# Patient Record
Sex: MALE | Race: WHITE | NOT HISPANIC OR LATINO | Employment: FULL TIME | URBAN - METROPOLITAN AREA
[De-identification: names, ages, dates, MRNs, and addresses within clinical notes are randomized per-mention and may not be internally consistent; named-entity substitution may affect disease eponyms.]

---

## 2018-01-16 ENCOUNTER — OFFICE VISIT (OUTPATIENT)
Dept: ORTHOPEDIC SURGERY | Facility: CLINIC | Age: 48
End: 2018-01-16

## 2018-01-16 VITALS — BODY MASS INDEX: 32.14 KG/M2 | TEMPERATURE: 98.6 F | WEIGHT: 200 LBS | HEIGHT: 66 IN

## 2018-01-16 DIAGNOSIS — M16.11 ARTHRITIS OF RIGHT HIP: ICD-10-CM

## 2018-01-16 DIAGNOSIS — M25.551 HIP PAIN, RIGHT: Primary | ICD-10-CM

## 2018-01-16 PROCEDURE — 73502 X-RAY EXAM HIP UNI 2-3 VIEWS: CPT | Performed by: ORTHOPAEDIC SURGERY

## 2018-01-16 PROCEDURE — 99204 OFFICE O/P NEW MOD 45 MIN: CPT | Performed by: ORTHOPAEDIC SURGERY

## 2018-01-16 RX ORDER — LOSARTAN POTASSIUM AND HYDROCHLOROTHIAZIDE 12.5; 5 MG/1; MG/1
1 TABLET ORAL
COMMUNITY
Start: 2017-12-28

## 2018-01-16 RX ORDER — OMEPRAZOLE 20 MG/1
20 CAPSULE, DELAYED RELEASE ORAL
COMMUNITY
Start: 2017-10-23

## 2018-01-16 NOTE — PROGRESS NOTES
Patient Name: Anant Keyes   YOB: 1970  Referring Primary Care Physician: Art Hairston MD      Chief Complaint:    Chief Complaint   Patient presents with   • Right Hip - Pain        Subjective:    HPI:   Anant Keyes is a pleasant 47 y.o. year old who presents today for evaluation of   Chief Complaint   Patient presents with   • Right Hip - Pain   right hip pain since about August.  No particular trauma.  Achy in the groin and runs down the ant thigh.  Intermittent flares.  Takes 2 alleve prn.  Has been in PT for 2 months.      This problem is new to this examiner.     Medications:   Home Medications:  No current outpatient prescriptions on file prior to visit.     No current facility-administered medications on file prior to visit.      Current Medications:  Scheduled Meds:  Continuous Infusions:  No current facility-administered medications for this visit.   PRN Meds:.    I have reviewed the patient's medical history in detail and updated the computerized patient record.  Review and summarization of old records includes:    Past Medical History:   Diagnosis Date   • Acid reflux    • Hypertension         Past Surgical History:   Procedure Laterality Date   • BLADDER TUMOR EXCISION  07/2008        Social History     Occupational History   • Not on file.     Social History Main Topics   • Smoking status: Former Smoker     Years: 5.00     Types: Cigarettes   • Smokeless tobacco: Not on file      Comment: casual smoker, quit 2005 or 2006   • Alcohol use No   • Drug use: Defer   • Sexual activity: Defer    Social History     Social History Narrative   • No narrative on file        Family History   Problem Relation Age of Onset   • Hypertension Mother    • Diabetes Mother    • Hypertension Father        ROS: 14 point review of systems was performed and all other systems were reviewed and are negative except for documented findings in HPI and today's encounter.     Allergies: No Known  Allergies  Constitutional:  Denies fever, shaking or chills   Eyes:  Denies change in visual acuity   HENT:  Denies nasal congestion or sore throat   Respiratory:  Denies cough or shortness of breath   Cardiovascular:  Denies chest pain or severe LE edema   GI:  Denies abdominal pain, nausea, vomiting, bloody stools or diarrhea   Musculoskeletal:  Numbness, tingling, pain, or loss of motor function only as noted above in history of present illness.  : Denies painful urination or hematuria  Integument:  Denies rash, lesion or ulceration   Neurologic:  Denies headache or focal weakness  Endocrine:  Denies lymphadenopathy  Psych:  Denies confusion or change in mental status   Hem:  Denies active bleeding    Objective:    Physical Exam: 47 y.o. male  Body mass index is 32.28 kg/(m^2)., @WT@, @HT@  Vitals:    01/16/18 1310   Temp: 98.6 °F (37 °C)     Vital signs reviewed.   General Appearance:    Alert, cooperative, in no acute distress                  Eyes: conjunctiva clear  ENT: external ears and nose atraumatic  CV: no peripheral edema  Resp: normal respiratory effort  Skin: no rashes or wounds; normal turgor  Psych: mood and affect appropriate  Lymph: no nodes appreciated  Neuro: gross sensation intact  Vascular:  Palpable peripheral pulse in noted extremity  Musculoskeletal Extremities: HIP Exam: stiff-legged gait without assistive device right hip, Stinchfield postitive, pain with internal rotation and stiffness 2+ pedal pulses and brisk capillary refill Pedal edema none    Radiology:   Imaging done today and discussed at length with the patient:    Indication: pain related symptoms,  Views: 2V AP&LAT right hip(s)   Findings: severe end-stage arthritis (bone on bone, subchondral sclerosis/cysts, osteophytes), advanced arthritis, and MRI that shows arthritis, fraying of the labrum, cyst in the iliopsoas  Comparison views: not available      Assessment:     ICD-10-CM ICD-9-CM   1. Hip pain, right M25.551 719.45    2. Arthritis of right hip M16.11 716.95        Procedures       Plan: Biomechanics of pertinent body area discussed.  Risks, benefits, alternatives, comparisons, and complications of accepted medicines, injections, recommendations, surgical procedures, and therapies explained and education provided in laymen's terms. The patient was given the opportunity to ask questions and they were answerved to their satisfaction.   Natural history and expected course of this patient's diagnosis discussed along with evaluation of therapies. Questions answered.  OTC analgesics as needed with dosage warning and instructions given.  Cryotherapy/brachy therapy as indicated with instructions.    Will set up for an injection at [facility] 2 bid with food with precuations (on omep and bp med), tylenol supplement and HEP of his PT.  Fu 4-6 wks after the injections      1/16/2018

## 2018-02-09 ENCOUNTER — HOSPITAL ENCOUNTER (OUTPATIENT)
Dept: GENERAL RADIOLOGY | Facility: HOSPITAL | Age: 48
Discharge: HOME | End: 2018-02-09
Attending: ORTHOPAEDIC SURGERY | Admitting: ORTHOPAEDIC SURGERY

## 2018-02-09 DIAGNOSIS — M16.11 ARTHRITIS OF RIGHT HIP: ICD-10-CM

## 2018-02-09 PROCEDURE — 77002 NEEDLE LOCALIZATION BY XRAY: CPT

## 2018-02-09 PROCEDURE — 0 IOPAMIDOL 61 % SOLUTION: Performed by: RADIOLOGY

## 2018-02-09 PROCEDURE — 25010000002 METHYLPREDNISOLONE PER 80 MG: Performed by: RADIOLOGY

## 2018-02-09 RX ORDER — BUPIVACAINE HYDROCHLORIDE 2.5 MG/ML
5 INJECTION, SOLUTION EPIDURAL; INFILTRATION; INTRACAUDAL ONCE
Status: COMPLETED | OUTPATIENT
Start: 2018-02-09 | End: 2018-02-09

## 2018-02-09 RX ORDER — METHYLPREDNISOLONE ACETATE 80 MG/ML
80 INJECTION, SUSPENSION INTRA-ARTICULAR; INTRALESIONAL; INTRAMUSCULAR; SOFT TISSUE ONCE
Status: COMPLETED | OUTPATIENT
Start: 2018-02-09 | End: 2018-02-09

## 2018-02-09 RX ORDER — LIDOCAINE HYDROCHLORIDE 10 MG/ML
10 INJECTION, SOLUTION INFILTRATION; PERINEURAL ONCE
Status: COMPLETED | OUTPATIENT
Start: 2018-02-09 | End: 2018-02-09

## 2018-02-09 RX ADMIN — BUPIVACAINE HYDROCHLORIDE 5 ML: 2.5 INJECTION, SOLUTION EPIDURAL; INFILTRATION; INTRACAUDAL; PERINEURAL at 12:22

## 2018-02-09 RX ADMIN — METHYLPREDNISOLONE ACETATE 80 MG: 80 INJECTION, SUSPENSION INTRA-ARTICULAR; INTRALESIONAL; INTRAMUSCULAR; SOFT TISSUE at 12:22

## 2018-02-09 RX ADMIN — LIDOCAINE HYDROCHLORIDE 5 ML: 10 INJECTION, SOLUTION INFILTRATION; PERINEURAL at 12:20

## 2018-02-09 RX ADMIN — IOPAMIDOL 2 ML: 612 INJECTION, SOLUTION INTRAVENOUS at 12:22

## 2018-07-24 ENCOUNTER — OFFICE VISIT (OUTPATIENT)
Dept: ORTHOPEDIC SURGERY | Facility: CLINIC | Age: 48
End: 2018-07-24

## 2018-07-24 VITALS — TEMPERATURE: 98.7 F | BODY MASS INDEX: 33.59 KG/M2 | WEIGHT: 209 LBS | HEIGHT: 66 IN

## 2018-07-24 DIAGNOSIS — M16.11 ARTHRITIS OF RIGHT HIP: Primary | ICD-10-CM

## 2018-07-24 DIAGNOSIS — M25.551 HIP PAIN, RIGHT: ICD-10-CM

## 2018-07-24 DIAGNOSIS — M70.61 TROCHANTERIC BURSITIS OF RIGHT HIP: ICD-10-CM

## 2018-07-24 PROCEDURE — 73502 X-RAY EXAM HIP UNI 2-3 VIEWS: CPT | Performed by: NURSE PRACTITIONER

## 2018-07-24 PROCEDURE — 99212 OFFICE O/P EST SF 10 MIN: CPT | Performed by: NURSE PRACTITIONER

## 2018-07-24 PROCEDURE — 20610 DRAIN/INJ JOINT/BURSA W/O US: CPT | Performed by: NURSE PRACTITIONER

## 2018-07-24 RX ORDER — LIDOCAINE HYDROCHLORIDE 20 MG/ML
4 INJECTION, SOLUTION EPIDURAL; INFILTRATION; INTRACAUDAL; PERINEURAL
Status: COMPLETED | OUTPATIENT
Start: 2018-07-24 | End: 2018-07-24

## 2018-07-24 RX ORDER — METHYLPREDNISOLONE ACETATE 80 MG/ML
80 INJECTION, SUSPENSION INTRA-ARTICULAR; INTRALESIONAL; INTRAMUSCULAR; SOFT TISSUE
Status: COMPLETED | OUTPATIENT
Start: 2018-07-24 | End: 2018-07-24

## 2018-07-24 RX ADMIN — LIDOCAINE HYDROCHLORIDE 4 ML: 20 INJECTION, SOLUTION EPIDURAL; INFILTRATION; INTRACAUDAL; PERINEURAL at 14:28

## 2018-07-24 RX ADMIN — METHYLPREDNISOLONE ACETATE 80 MG: 80 INJECTION, SUSPENSION INTRA-ARTICULAR; INTRALESIONAL; INTRAMUSCULAR; SOFT TISSUE at 14:28

## 2018-07-24 NOTE — PROGRESS NOTES
Patient Name: Anant Keyes   YOB: 1970  Referring Primary Care Physician: Art Hairston MD  BMI: Body mass index is 33.73 kg/m².    Chief Complaint:    Chief Complaint   Patient presents with   • Right Hip - Follow-up, Pain        Subjective:    HPI:   Anant Keyes is a pleasant 47 y.o. year old who presents today for evaluation of   Chief Complaint   Patient presents with   • Right Hip - Follow-up, Pain    (Location). Duration: This has been going on for months. Timing: The pain is intermittent., persistent. and acute on chronic. Context or causative factors: unknown.  Relieving Factors:  In the past has tried OTC meds/NSAIDS  Had cortisone inj in the right hip in feb with maybe a little immediate relief but largely ineffective.     This problem is new to this examiner.     Medications:   Home Medications:  Current Outpatient Prescriptions on File Prior to Visit   Medication Sig   • losartan-hydrochlorothiazide (HYZAAR) 50-12.5 MG per tablet Take 1 tablet by mouth.   • omeprazole (priLOSEC) 20 MG capsule Take 20 mg by mouth.     No current facility-administered medications on file prior to visit.      Current Medications:  Scheduled Meds:  Continuous Infusions:  No current facility-administered medications for this visit.   PRN Meds:.    I have reviewed the patient's medical history in detail and updated the computerized patient record.  Review and summarization of old records includes:    Past Medical History:   Diagnosis Date   • Acid reflux    • Hypertension         Past Surgical History:   Procedure Laterality Date   • BLADDER TUMOR EXCISION  07/2008        Social History     Occupational History   • Not on file.     Social History Main Topics   • Smoking status: Former Smoker     Years: 5.00     Types: Cigarettes   • Smokeless tobacco: Not on file      Comment: casual smoker, quit 2005 or 2006   • Alcohol use No   • Drug use: Unknown   • Sexual activity: Defer    Social  "History     Social History Narrative   • No narrative on file        Family History   Problem Relation Age of Onset   • Hypertension Mother    • Diabetes Mother    • Hypertension Father        ROS: 14 point review of systems was performed and all other systems were reviewed and are negative except for documented findings in HPI and today's encounter.     Allergies:   Allergies   Allergen Reactions   • Rosemary Oil Swelling     Constitutional:  Denies fever, shaking or chills   Eyes:  Denies change in visual acuity   HENT:  Denies nasal congestion or sore throat   Respiratory:  Denies cough or shortness of breath   Cardiovascular:  Denies chest pain or severe LE edema   GI:  Denies abdominal pain, nausea, vomiting, bloody stools or diarrhea   Musculoskeletal:  Numbness, tingling, pain, or loss of motor function only as noted above in history of present illness.  : Denies painful urination or hematuria  Integument:  Denies rash, lesion or ulceration   Neurologic:  Denies headache or focal weakness  Endocrine:  Denies lymphadenopathy  Psych:  Denies confusion or change in mental status   Hem:  Denies active bleeding    Subjective     Objective:    Physical Exam: 47 y.o. male  Wt Readings from Last 3 Encounters:   07/24/18 94.8 kg (209 lb)   01/16/18 90.7 kg (200 lb)     Ht Readings from Last 3 Encounters:   07/24/18 167.6 cm (66\")   01/16/18 167.6 cm (66\")     Body mass index is 33.73 kg/m².    Vitals:    07/24/18 1345   Temp: 98.7 °F (37.1 °C)       Vital signs reviewed.   General Appearance:    Alert, cooperative, in no acute distress                  Eyes: conjunctiva clear  ENT: external ears and nose atraumatic  CV: no peripheral edema  Resp: normal respiratory effort  Skin: no rashes or wounds; normal turgor  Psych: mood and affect appropriate  Lymph: no nodes appreciated  Neuro: gross sensation intact  Vascular:  Palpable peripheral pulse in noted extremity  Musculoskeletal Extremities: HIP Exam: normal gait " without assistive device right hip, Stinchfield negative, stiffness, AMY test negative and trochanteric bursa tenderness 2+ pedal pulses and brisk capillary refill Pedal edema none      Radiology:   Imaging done today, images were personally viewed and discussed at length with the patient:    Indication: pain related symptoms,  Views: 2V AP&LAT right hip(s)   Findings: severe end-stage arthritis (bone on bone, subchondral sclerosis/cysts, osteophytes)  Comparison views: viewed last xray done in the office.       Assessment:     ICD-10-CM ICD-9-CM   1. Arthritis of right hip M16.11 716.95   2. Trochanteric bursitis of right hip M70.61 726.5   3. Hip pain, right M25.551 719.45        Large Joint Arthrocentesis  Date/Time: 7/24/2018 2:28 PM  Consent given by: patient  Site marked: site marked  Timeout: Immediately prior to procedure a time out was called to verify the correct patient, procedure, equipment, support staff and site/side marked as required   Supporting Documentation  Indications: pain and joint swelling   Procedure Details  Location: hip - R greater trochanteric bursa  Preparation: Patient was prepped and draped in the usual sterile fashion  Needle gauge: 21.  Approach: anterolateral  Medications administered: 80 mg methylPREDNISolone acetate 80 MG/ML; 4 mL lidocaine PF 2% 2 %  Patient tolerance: patient tolerated the procedure well with no immediate complications             Plan:  15 min spent face to face with patient 11 min spent counseling about natural history and expected course of assessed complaint and reviewed treatment options that have been tried and not tried and those currently available. Questions answered.    Natural history and expected course of this patient's diagnosis discussed along with evaluation of therapies. Questions answered.  Cortisone Injection. See procedure note.  OTC analgesics as needed with dosage warning and instructions given: OTC meds:  Acetaminophen  Cryotherapy/brachy therapy as indicated with instructions.   Advised on strengthening exercises, stressed importance of these with progression of arthritis, demonstrated exercises to patient with repeat demonstration and verb of understanding.   Will add core strengthening exercises and use proper lifting technique as he lifts weights.   Bursa inj today for diagnostic and therapeutic purposes   7/24/2018   WYATTR

## 2018-07-30 ENCOUNTER — TELEPHONE (OUTPATIENT)
Dept: ORTHOPEDIC SURGERY | Facility: CLINIC | Age: 48
End: 2018-07-30

## 2018-07-31 NOTE — TELEPHONE ENCOUNTER
The disc is ready at the  for p/u.  Could you please call pt. And let them know?  I am unable to use our phone due to x-ray maintenance.  XR-ED